# Patient Record
Sex: FEMALE | Race: WHITE | Employment: OTHER | URBAN - METROPOLITAN AREA
[De-identification: names, ages, dates, MRNs, and addresses within clinical notes are randomized per-mention and may not be internally consistent; named-entity substitution may affect disease eponyms.]

---

## 2017-05-08 ENCOUNTER — OFFICE VISIT (OUTPATIENT)
Dept: NEUROLOGY | Facility: CLINIC | Age: 72
End: 2017-05-08

## 2017-05-08 VITALS
HEART RATE: 79 BPM | BODY MASS INDEX: 33.09 KG/M2 | HEIGHT: 65.5 IN | WEIGHT: 201 LBS | DIASTOLIC BLOOD PRESSURE: 60 MMHG | RESPIRATION RATE: 16 BRPM | SYSTOLIC BLOOD PRESSURE: 124 MMHG

## 2017-05-08 DIAGNOSIS — M47.12 OSTEOARTHRITIS OF CERVICAL SPINE WITH MYELOPATHY: ICD-10-CM

## 2017-05-08 DIAGNOSIS — G56.22 ULNAR NEUROPATHY AT ELBOW, LEFT: ICD-10-CM

## 2017-05-08 DIAGNOSIS — D49.7 SPINAL CORD TUMOR: ICD-10-CM

## 2017-05-08 DIAGNOSIS — M54.16 LUMBAR RADICULOPATHY: Primary | ICD-10-CM

## 2017-05-08 PROBLEM — G56.20 ULNAR NEUROPATHY AT ELBOW: Status: ACTIVE | Noted: 2017-05-08

## 2017-05-08 PROCEDURE — 99213 OFFICE O/P EST LOW 20 MIN: CPT | Performed by: OTHER

## 2017-05-08 NOTE — PATIENT INSTRUCTIONS
Refill policies:    • Allow 2 business days for refills; controlled substances may take longer.   • Contact your pharmacy at least 5 days prior to running out of medication and have them send an electronic request or submit request through the “request re insurance carrier to obtain pre-certification or prior authorization. Unfortunately, [facility] has seen an increase in denial of payment even though the procedure/test has been pre-certified.   You are strongly encouraged to contact your insurance carrier to v

## 2017-05-08 NOTE — PROGRESS NOTES
[facility] with [address]   8/6/1945  Primary Care Provider:  No primary care provider on file.     5/8/2017    66year old yo patient being seen for:  Cervical cord tumor    2 thyromegaly  Lungs clear breath sounds  Heart S1S2, no abnormal sounds  Pink nailbeds no Cyanosis, pulses palpated    DTRs 1+ symmetric uppers  Slightly weak left ADQ  Leg findings same  Gait antalgic    MRI Cervical spine 2015  There is a stable enhancing

## 2017-11-09 ENCOUNTER — TELEPHONE (OUTPATIENT)
Dept: NEUROLOGY | Facility: CLINIC | Age: 72
End: 2017-11-09

## 2017-11-16 ENCOUNTER — OFFICE VISIT (OUTPATIENT)
Dept: NEUROLOGY | Facility: CLINIC | Age: 72
End: 2017-11-16

## 2017-11-16 VITALS
BODY MASS INDEX: 31.34 KG/M2 | SYSTOLIC BLOOD PRESSURE: 120 MMHG | DIASTOLIC BLOOD PRESSURE: 43 MMHG | HEART RATE: 69 BPM | HEIGHT: 66 IN | RESPIRATION RATE: 18 BRPM | WEIGHT: 195 LBS

## 2017-11-16 DIAGNOSIS — D49.7 SPINAL CORD TUMOR: Primary | ICD-10-CM

## 2017-11-16 DIAGNOSIS — M47.22 OSTEOARTHRITIS OF SPINE WITH RADICULOPATHY, CERVICAL REGION: ICD-10-CM

## 2017-11-16 PROCEDURE — 99213 OFFICE O/P EST LOW 20 MIN: CPT | Performed by: OTHER

## 2017-11-16 RX ORDER — LEVOTHYROXINE SODIUM 0.12 MG/1
TABLET ORAL
Refills: 3 | COMMUNITY
Start: 2017-10-13

## 2017-11-16 RX ORDER — LOSARTAN POTASSIUM 50 MG/1
TABLET ORAL
COMMUNITY
Start: 2017-11-14

## 2017-11-16 RX ORDER — ATENOLOL 25 MG/1
TABLET ORAL
COMMUNITY
Start: 2017-11-14 | End: 2018-12-11

## 2017-11-16 NOTE — PATIENT INSTRUCTIONS
Refill policies:    • Allow 2-3 business days for refills; controlled substances may take longer.   • Contact your pharmacy at least 5 days prior to running out of medication and have them send an electronic request or submit request through the [facility] have a procedure or additional testing performed. [facility]) will contact your insurance carrier to obtain pre-certification or prior authorization.     Unfortunately, [facility] has seen an increase in denial of payment even though the p

## 2017-11-16 NOTE — PROGRESS NOTES
[facility] with [address]  8/6/1945  Primary Care Provider:  No primary care provider on file.     11/16/2017    74year old yo patient being seen for:  Neck pain and lesion with radiculopathy, cervical region    Discussion on the case:  Stable neurologically    Diagnostics:  none    Treatment:  Same medications    Follow up, in approximately:  ( ) 6-8 weeks    ( ) 3-4 months     (x) 6-8 months   ( ) yearly   ( ) As needed but

## 2018-04-12 RX ORDER — BACLOFEN 10 MG/1
20 TABLET ORAL NIGHTLY
Qty: 60 TABLET | Refills: 5 | Status: SHIPPED | OUTPATIENT
Start: 2018-04-12 | End: 2018-04-23

## 2018-04-23 DIAGNOSIS — M62.838 MUSCLE SPASMS OF BOTH LOWER EXTREMITIES: Primary | ICD-10-CM

## 2018-04-23 RX ORDER — BACLOFEN 10 MG/1
20 TABLET ORAL NIGHTLY
Qty: 60 TABLET | Refills: 5 | Status: SHIPPED | OUTPATIENT
Start: 2018-04-23 | End: 2018-12-11 | Stop reason: DRUGHIGH

## 2018-04-23 NOTE — TELEPHONE ENCOUNTER
Pt's baclofen was refilled on 4/12/18, but it went to the wrong pharmacy. Her insurance requires [facility] pharmacy. Please send to [facility] on [address] in [place], [place]. Pharmacy added to her chart.

## 2018-04-23 NOTE — TELEPHONE ENCOUNTER
Medication: Baclofen 20 mg  Date of last refill: 04/12/18 with 5 addt refills (Wrong Pharmacy)  [place]/ (if [place]     Last office visit: 11/16/2017  Due back to clinic per last office note:  RTN in 8 months  Date next office visi

## 2018-10-17 ENCOUNTER — TELEPHONE (OUTPATIENT)
Dept: NEUROLOGY | Facility: CLINIC | Age: 73
End: 2018-10-17

## 2018-10-17 NOTE — TELEPHONE ENCOUNTER
S: Calling with muscle spasms that have been getting progressively worse, acute worsening yesterday    B: LOV 11/16/17 for spinal cord tumor/lesion, with notes:  Discussion on the case:  Stable neurologically     Diagnostics:  none     Treatment:  Same med

## 2018-12-11 ENCOUNTER — OFFICE VISIT (OUTPATIENT)
Dept: NEUROLOGY | Facility: CLINIC | Age: 73
End: 2018-12-11
Payer: MEDICARE

## 2018-12-11 VITALS
RESPIRATION RATE: 16 BRPM | DIASTOLIC BLOOD PRESSURE: 70 MMHG | WEIGHT: 195 LBS | SYSTOLIC BLOOD PRESSURE: 126 MMHG | BODY MASS INDEX: 31.34 KG/M2 | HEART RATE: 78 BPM | HEIGHT: 66 IN

## 2018-12-11 DIAGNOSIS — M47.12 OSTEOARTHRITIS OF CERVICAL SPINE WITH MYELOPATHY: ICD-10-CM

## 2018-12-11 DIAGNOSIS — M62.838 MUSCLE SPASMS OF BOTH LOWER EXTREMITIES: ICD-10-CM

## 2018-12-11 DIAGNOSIS — D49.7 SPINAL CORD TUMOR: Primary | ICD-10-CM

## 2018-12-11 PROCEDURE — 99213 OFFICE O/P EST LOW 20 MIN: CPT | Performed by: OTHER

## 2018-12-11 RX ORDER — CARVEDILOL 6.25 MG/1
6.25 TABLET ORAL 2 TIMES DAILY WITH MEALS
COMMUNITY

## 2018-12-11 RX ORDER — BACLOFEN 10 MG/1
20 TABLET ORAL AS NEEDED
COMMUNITY

## 2018-12-11 NOTE — PROGRESS NOTES
[facility] with [address]  8/6/1945  Primary Care Provider:  No primary care provider on file.     12/11/2018  Accompanied visit:  ( ) No (x) yes, by:     74year old yo sounds  Heart S1S2, no abnormal sounds  Pink nailbeds no Cyanosis, pulses palpated    5/5 neck flexor and uppers  DTRS symmetric arms and legs no [facility] signs    During today's visit, the following items were reviewed: medications and radiology results, and t

## 2018-12-11 NOTE — PATIENT INSTRUCTIONS
Refill policies:    • Allow 2-3 business days for refills; controlled substances may take longer.   • Contact your pharmacy at least 5 days prior to running out of medication and have them send an electronic request or submit request through the “request re entire amount billed. Precertification and Prior Authorizations: If your physician has recommended that you have a procedure or additional testing performed.   [facility]) will contact your insurance carrier to obtain pre-certi

## 2019-06-17 ENCOUNTER — TELEPHONE (OUTPATIENT)
Dept: NEUROLOGY | Facility: CLINIC | Age: 74
End: 2019-06-17

## 2019-06-17 NOTE — TELEPHONE ENCOUNTER
Pt and her spouse have 6/20/19 OV scheduled for 6/20/19. Explained we will need to reschedule due to Dr. Silvia Mathews schedule change. Spouse expressed understanding and said he will call us back to reschedule.

## 2020-09-10 ENCOUNTER — TELEPHONE (OUTPATIENT)
Dept: NEUROLOGY | Facility: CLINIC | Age: 75
End: 2020-09-10

## 2020-09-10 ENCOUNTER — OFFICE VISIT (OUTPATIENT)
Dept: NEUROLOGY | Facility: CLINIC | Age: 75
End: 2020-09-10
Payer: MEDICARE

## 2020-09-10 VITALS
TEMPERATURE: 98 F | SYSTOLIC BLOOD PRESSURE: 126 MMHG | WEIGHT: 195 LBS | DIASTOLIC BLOOD PRESSURE: 80 MMHG | HEART RATE: 88 BPM | BODY MASS INDEX: 31.34 KG/M2 | RESPIRATION RATE: 16 BRPM | HEIGHT: 66 IN

## 2020-09-10 DIAGNOSIS — M62.838 MUSCLE SPASMS OF BOTH LOWER EXTREMITIES: ICD-10-CM

## 2020-09-10 DIAGNOSIS — D49.7 SPINAL CORD TUMOR: Primary | ICD-10-CM

## 2020-09-10 DIAGNOSIS — M47.12 OSTEOARTHRITIS OF CERVICAL SPINE WITH MYELOPATHY: ICD-10-CM

## 2020-09-10 PROCEDURE — 99213 OFFICE O/P EST LOW 20 MIN: CPT | Performed by: OTHER

## 2020-09-10 RX ORDER — CLONAZEPAM 0.5 MG/1
0.5 TABLET ORAL 2 TIMES DAILY PRN
Qty: 60 TABLET | Refills: 3 | Status: SHIPPED | OUTPATIENT
Start: 2020-09-10

## 2020-09-10 NOTE — PROGRESS NOTES
[id number] [place] [address] [place] [facility] [place] [place]  9/10/2020    2:14 PM    Follow up on known spinal cord lesion [  Reports spasms in legs and arms  She also gets spasms in her chest and abdomen  She is not taking Baclofen w Spinal cord tumor, not a major change compared to 2015 MRI  Disc degeneration a bit worse    Klonopin 0.5 mg BID for spasms  Aware of side effects      Barb Taylor MD  Vascular & General Neurology  Director, Multiple Sclerosis Program  Deborah Serrato

## 2020-09-10 NOTE — TELEPHONE ENCOUNTER
[facility]  DOS:  8/25/2020  MRI Brain & MRI Cervical test results received    Uploaded one disc  Radiology confirmed uploaded  Returned one disc to pt

## 2021-04-19 ENCOUNTER — TELEPHONE (OUTPATIENT)
Dept: NEUROLOGY | Facility: CLINIC | Age: 76
End: 2021-04-19

## 2021-04-19 NOTE — TELEPHONE ENCOUNTER
Patient contacted our office regarding getting the COVID-19 vaccine.   Patient was informed that the [facility] providers are all in favor of everyone getting the vaccine, provided they meet the CDC guidelines for receiving it (visit CDC website f

## 2022-04-21 ENCOUNTER — OFFICE VISIT (OUTPATIENT)
Dept: NEUROLOGY | Facility: CLINIC | Age: 77
End: 2022-04-21
Payer: MEDICARE

## 2022-04-21 VITALS
SYSTOLIC BLOOD PRESSURE: 136 MMHG | WEIGHT: 195 LBS | BODY MASS INDEX: 31.34 KG/M2 | RESPIRATION RATE: 16 BRPM | DIASTOLIC BLOOD PRESSURE: 62 MMHG | HEART RATE: 70 BPM | HEIGHT: 66 IN

## 2022-04-21 DIAGNOSIS — M62.838 MUSCLE SPASMS OF BOTH LOWER EXTREMITIES: ICD-10-CM

## 2022-04-21 DIAGNOSIS — G25.2 POSTURAL TREMOR: ICD-10-CM

## 2022-04-21 DIAGNOSIS — D49.7 SPINAL CORD TUMOR: Primary | ICD-10-CM

## 2022-04-21 PROCEDURE — 99213 OFFICE O/P EST LOW 20 MIN: CPT | Performed by: OTHER

## 2022-04-21 RX ORDER — BACLOFEN 10 MG/1
20 TABLET ORAL 2 TIMES DAILY
Qty: 60 TABLET | Refills: 5 | Status: SHIPPED | OUTPATIENT
Start: 2022-04-21

## 2023-06-13 ENCOUNTER — TELEPHONE (OUTPATIENT)
Dept: NEUROLOGY | Facility: CLINIC | Age: 78
End: 2023-06-13

## 2023-06-13 NOTE — TELEPHONE ENCOUNTER
Pt call she is really concerned she had stated having tremors on both hands      Please call to advice

## 2023-06-13 NOTE — TELEPHONE ENCOUNTER
Patient reports that about 4-5 weeks ago her right hand and arm started with tremors/shaking. She has been experiencing the same in her left hand/arm for at least 6-7 months, but the right arm is new, which prompted the call. She states she has also lost precision and coordination with right hand, and is dropping things. Patient hasn't been seen in over a year (April 2022), but scheduled next available appointment in September 2023. Of note, Dr. Domenic Atwood prescribed baclofen for her muscle spasms at that last office visit, but she has not taken this recently and still has some left. Patient states she didn't take regularly, preferring to \"learn to live with the spasms. \"

## 2023-06-15 RX ORDER — PRIMIDONE 50 MG/1
50 TABLET ORAL NIGHTLY
Qty: 30 TABLET | Refills: 5 | Status: SHIPPED | OUTPATIENT
Start: 2023-06-15

## 2023-11-09 ENCOUNTER — OFFICE VISIT (OUTPATIENT)
Dept: NEUROLOGY | Facility: CLINIC | Age: 78
End: 2023-11-09
Payer: MEDICARE

## 2023-11-09 VITALS
WEIGHT: 195 LBS | BODY MASS INDEX: 31.34 KG/M2 | HEART RATE: 80 BPM | SYSTOLIC BLOOD PRESSURE: 132 MMHG | HEIGHT: 66 IN | RESPIRATION RATE: 16 BRPM | DIASTOLIC BLOOD PRESSURE: 70 MMHG

## 2023-11-09 DIAGNOSIS — D49.7 SPINAL CORD TUMOR: Primary | ICD-10-CM

## 2023-11-09 DIAGNOSIS — G25.2 POSTURAL TREMOR: ICD-10-CM

## 2023-11-09 DIAGNOSIS — M54.16 LUMBAR RADICULOPATHY: ICD-10-CM

## 2023-11-09 PROCEDURE — 99214 OFFICE O/P EST MOD 30 MIN: CPT | Performed by: OTHER

## 2023-11-09 RX ORDER — METHYLPREDNISOLONE 4 MG/1
TABLET ORAL
Qty: 1 EACH | Refills: 0 | Status: SHIPPED | OUTPATIENT
Start: 2023-11-09

## 2023-11-09 RX ORDER — PRIMIDONE 50 MG/1
50 TABLET ORAL NIGHTLY
Qty: 90 TABLET | Refills: 3 | Status: SHIPPED | OUTPATIENT
Start: 2023-11-09

## 2023-11-09 RX ORDER — CLONAZEPAM 0.5 MG/1
0.5 TABLET ORAL 2 TIMES DAILY PRN
Qty: 60 TABLET | Refills: 5 | Status: SHIPPED | OUTPATIENT
Start: 2023-11-09

## (undated) NOTE — MR AVS SNAPSHOT
[address] [facility] [address] [phone]               Thank you for choosing us for your health care visit with Abdirizak Arriola MD.  We are glad to serve you and happy to provide you with this summary of your v ? If your prescription is due for a refill, you may be due for a follow up appointment. ? To best provide you care, patients receiving routine medications need to be seen at least once a year.      protocol for controlled substances:  Written prescr Pcn [Bicillin C-R,] Hives                Today's Vital Signs     BP Pulse Height Weight BMI    124/60 mmHg 79 65.5\" 201 lb 32.93 kg/m2         Current Medications          This list is accurate as of: 5/8/17 11:47 AM.  Always use your most recent med lis